# Patient Record
Sex: MALE | Race: WHITE | Employment: FULL TIME | URBAN - NONMETROPOLITAN AREA
[De-identification: names, ages, dates, MRNs, and addresses within clinical notes are randomized per-mention and may not be internally consistent; named-entity substitution may affect disease eponyms.]

---

## 2017-01-03 PROBLEM — F17.210 CIGARETTE SMOKER: Status: ACTIVE | Noted: 2017-01-03

## 2017-01-03 PROBLEM — J32.9 SINUSITIS: Status: ACTIVE | Noted: 2017-01-03

## 2017-01-03 PROBLEM — H66.92 LEFT OTITIS MEDIA: Status: ACTIVE | Noted: 2017-01-03

## 2017-06-03 ENCOUNTER — HOSPITAL ENCOUNTER (OUTPATIENT)
Age: 47
Discharge: HOME | End: 2017-06-03
Payer: COMMERCIAL

## 2017-06-03 DIAGNOSIS — E78.5 HYPERLIPIDEMIA, UNSPECIFIED HYPERLIPIDEMIA TYPE: ICD-10-CM

## 2017-06-03 DIAGNOSIS — Z12.5 SCREENING PSA (PROSTATE SPECIFIC ANTIGEN): ICD-10-CM

## 2017-06-03 DIAGNOSIS — R73.9 HYPERGLYCEMIA: ICD-10-CM

## 2017-06-03 LAB
ALT SERPL-CCNC: 31 U/L (ref 5–41)
ANION GAP SERPL CALCULATED.3IONS-SCNC: 13 MMOL/L (ref 9–17)
AST SERPL-CCNC: 20 U/L
BUN BLDV-MCNC: 13 MG/DL (ref 6–20)
BUN/CREAT BLD: 16 (ref 9–20)
CALCIUM SERPL-MCNC: 9.1 MG/DL (ref 8.6–10.4)
CHLORIDE BLD-SCNC: 103 MMOL/L (ref 98–107)
CHOLESTEROL/HDL RATIO: 3.9
CHOLESTEROL: 211 MG/DL
CO2: 21 MMOL/L (ref 20–31)
CREAT SERPL-MCNC: 0.82 MG/DL (ref 0.7–1.2)
ESTIMATED AVERAGE GLUCOSE: 103 MG/DL
GFR AFRICAN AMERICAN: >60 ML/MIN
GFR NON-AFRICAN AMERICAN: >60 ML/MIN
GFR SERPL CREATININE-BSD FRML MDRD: ABNORMAL ML/MIN/{1.73_M2}
GFR SERPL CREATININE-BSD FRML MDRD: ABNORMAL ML/MIN/{1.73_M2}
GLUCOSE BLD-MCNC: 103 MG/DL (ref 70–99)
HBA1C MFR BLD: 5.2 % (ref 4.8–5.9)
HCT VFR BLD CALC: 47.1 % (ref 41–53)
HDLC SERPL-MCNC: 54 MG/DL
HEMOGLOBIN: 16 G/DL (ref 13.5–17)
HIGH SENSITIVE C-REACTIVE PROTEIN: 1.7 MG/L
LDL CHOLESTEROL: 134 MG/DL (ref 0–130)
MCH RBC QN AUTO: 31.7 PG (ref 26–34)
MCHC RBC AUTO-ENTMCNC: 34 G/DL (ref 31–37)
MCV RBC AUTO: 93.2 FL (ref 80–100)
PDW BLD-RTO: 12.8 % (ref 12.1–15.2)
PLATELET # BLD: 232 K/UL (ref 140–450)
PMV BLD AUTO: NORMAL FL (ref 6–12)
POTASSIUM SERPL-SCNC: 4.1 MMOL/L (ref 3.7–5.3)
PROSTATE SPECIFIC ANTIGEN: 0.88 UG/L
RBC # BLD: 5.05 M/UL (ref 4.5–5.9)
SODIUM BLD-SCNC: 137 MMOL/L (ref 135–144)
TRIGL SERPL-MCNC: 113 MG/DL
VLDLC SERPL CALC-MCNC: ABNORMAL MG/DL (ref 1–30)
WBC # BLD: 6.7 K/UL (ref 3.5–11)

## 2017-06-03 PROCEDURE — 86141 C-REACTIVE PROTEIN HS: CPT

## 2017-06-03 PROCEDURE — 80048 BASIC METABOLIC PNL TOTAL CA: CPT

## 2017-06-03 PROCEDURE — 36415 COLL VENOUS BLD VENIPUNCTURE: CPT

## 2017-06-03 PROCEDURE — 84450 TRANSFERASE (AST) (SGOT): CPT

## 2017-06-03 PROCEDURE — 85027 COMPLETE CBC AUTOMATED: CPT

## 2017-06-03 PROCEDURE — G0103 PSA SCREENING: HCPCS

## 2017-06-03 PROCEDURE — 84460 ALANINE AMINO (ALT) (SGPT): CPT

## 2017-06-03 PROCEDURE — 83036 HEMOGLOBIN GLYCOSYLATED A1C: CPT

## 2017-06-03 PROCEDURE — 80061 LIPID PANEL: CPT

## 2017-06-06 PROBLEM — I10 ESSENTIAL HYPERTENSION: Status: ACTIVE | Noted: 2017-06-06

## 2017-06-06 PROBLEM — K21.9 GASTROESOPHAGEAL REFLUX DISEASE: Status: ACTIVE | Noted: 2017-06-06

## 2017-06-06 PROBLEM — Z00.00 ROUTINE GENERAL MEDICAL EXAMINATION AT A HEALTH CARE FACILITY: Status: ACTIVE | Noted: 2017-06-06

## 2017-11-20 RX ORDER — LOVASTATIN 10 MG/1
10 TABLET ORAL NIGHTLY
Qty: 90 TABLET | Refills: 3 | Status: SHIPPED | OUTPATIENT
Start: 2017-11-20 | End: 2018-02-14 | Stop reason: SDUPTHER

## 2018-02-14 RX ORDER — LOVASTATIN 10 MG/1
10 TABLET ORAL NIGHTLY
Qty: 90 TABLET | Refills: 3 | Status: SHIPPED | OUTPATIENT
Start: 2018-02-14 | End: 2018-12-24 | Stop reason: SDUPTHER

## 2018-02-14 RX ORDER — LISINOPRIL 20 MG/1
20 TABLET ORAL DAILY
Qty: 90 TABLET | Refills: 3 | Status: SHIPPED | OUTPATIENT
Start: 2018-02-14 | End: 2018-12-24 | Stop reason: SDUPTHER

## 2018-07-13 ENCOUNTER — OFFICE VISIT (OUTPATIENT)
Dept: FAMILY MEDICINE CLINIC | Age: 48
End: 2018-07-13
Payer: COMMERCIAL

## 2018-07-13 VITALS
SYSTOLIC BLOOD PRESSURE: 112 MMHG | DIASTOLIC BLOOD PRESSURE: 72 MMHG | BODY MASS INDEX: 31.2 KG/M2 | WEIGHT: 198.8 LBS | HEIGHT: 67 IN

## 2018-07-13 DIAGNOSIS — R73.9 HYPERGLYCEMIA: ICD-10-CM

## 2018-07-13 DIAGNOSIS — Z00.00 ROUTINE GENERAL MEDICAL EXAMINATION AT A HEALTH CARE FACILITY: Primary | ICD-10-CM

## 2018-07-13 DIAGNOSIS — K21.9 GASTROESOPHAGEAL REFLUX DISEASE, ESOPHAGITIS PRESENCE NOT SPECIFIED: ICD-10-CM

## 2018-07-13 DIAGNOSIS — F17.210 CIGARETTE SMOKER: ICD-10-CM

## 2018-07-13 DIAGNOSIS — I10 ESSENTIAL HYPERTENSION: ICD-10-CM

## 2018-07-13 DIAGNOSIS — Z12.5 SCREENING PSA (PROSTATE SPECIFIC ANTIGEN): ICD-10-CM

## 2018-07-13 DIAGNOSIS — E78.49 OTHER HYPERLIPIDEMIA: ICD-10-CM

## 2018-07-13 PROCEDURE — 99396 PREV VISIT EST AGE 40-64: CPT | Performed by: FAMILY MEDICINE

## 2018-07-13 ASSESSMENT — PATIENT HEALTH QUESTIONNAIRE - PHQ9
2. FEELING DOWN, DEPRESSED OR HOPELESS: 0
SUM OF ALL RESPONSES TO PHQ9 QUESTIONS 1 & 2: 0
SUM OF ALL RESPONSES TO PHQ QUESTIONS 1-9: 0
1. LITTLE INTEREST OR PLEASURE IN DOING THINGS: 0

## 2018-07-13 NOTE — PATIENT INSTRUCTIONS
PLAN:  I discuss with him the importance of smoking cessation. The patient is educated on the negative effects that smoking has on his health and the importance of smoking cessation. I advised him to read the book The Easy Way to Quit Smoking by Conchita Pro  He is doing well. I will order labs and call with results. I will see him back in 1 year.

## 2018-07-13 NOTE — PROGRESS NOTES
irregular heartbeat. Denies palpitations. Gastrointestinal: Denies abdominal pain. Denies blood in the stool. Denies constipation. Denies diarrhea. Denies nausea. Denies vomiting. Genitourinary: Denies blood in the urine. Denies difficulty urinating. Denies frequent urination. Denies painful urination. Denies urinary incontinence  Musculoskeletal: Denies muscle aches. Denies painful joints. Denies swollen joints. Peripheral Vascular: Denies pain/cramping in legs after exertion. Skin: Denies dry skin. Denies itching. Denies rash. Neurologic: Denies falls. Denies dizziness. Denies fainting. Denies tingling/numbness. Psychiatric: Denies sleep disturbance. Denies anxiety. Denies depressed mood. Past Surgical History:   Procedure Laterality Date    OTHER SURGICAL HISTORY Left 2005    left parotid tumor, benign. Family History   Problem Relation Age of Onset    High Blood Pressure Father     Cancer Father         Lung    Diabetes Maternal Grandmother     Cancer Maternal Grandfather         colon     Past Medical History:   Diagnosis Date    Hyperglycemia 4/4/2016    Hyperlipidemia 10/11    Hypertension 2004    Nicotine abuse 10/2011    Obesity 10/2011      Social History   Substance Use Topics    Smoking status: Current Every Day Smoker     Packs/day: 1.00     Types: Cigarettes    Smokeless tobacco: Never Used    Alcohol use 0.0 oz/week      Current Outpatient Prescriptions   Medication Sig Dispense Refill    lisinopril (PRINIVIL;ZESTRIL) 20 MG tablet Take 1 tablet by mouth daily 90 tablet 3    lovastatin (MEVACOR) 10 MG tablet Take 1 tablet by mouth nightly 90 tablet 3    esomeprazole Magnesium (NEXIUM) 40 MG PACK Take 40 mg by mouth daily      aspirin 81 MG tablet Take 81 mg by mouth daily       No current facility-administered medications for this visit.       No Known Allergies    PHYSICAL EXAM:    /72 (Site: Left Arm, Position: Sitting, Cuff Size: Medium Adult)   Ht 5' 7\" (1.702 m)

## 2018-07-21 ENCOUNTER — HOSPITAL ENCOUNTER (OUTPATIENT)
Age: 48
Discharge: HOME | End: 2018-07-21
Payer: COMMERCIAL

## 2018-07-21 DIAGNOSIS — Z12.5 SCREENING PSA (PROSTATE SPECIFIC ANTIGEN): ICD-10-CM

## 2018-07-21 DIAGNOSIS — E78.49 OTHER HYPERLIPIDEMIA: ICD-10-CM

## 2018-07-21 DIAGNOSIS — R73.9 HYPERGLYCEMIA: ICD-10-CM

## 2018-07-21 LAB
ALT SERPL-CCNC: 32 U/L (ref 5–41)
ANION GAP SERPL CALCULATED.3IONS-SCNC: 9 MMOL/L (ref 9–17)
AST SERPL-CCNC: 17 U/L
BUN BLDV-MCNC: 21 MG/DL (ref 6–20)
BUN/CREAT BLD: 23 (ref 9–20)
CALCIUM SERPL-MCNC: 9.4 MG/DL (ref 8.6–10.4)
CHLORIDE BLD-SCNC: 110 MMOL/L (ref 98–107)
CO2: 24 MMOL/L (ref 20–31)
CREAT SERPL-MCNC: 0.91 MG/DL (ref 0.7–1.2)
ESTIMATED AVERAGE GLUCOSE: 91 MG/DL
GFR AFRICAN AMERICAN: >60 ML/MIN
GFR NON-AFRICAN AMERICAN: >60 ML/MIN
GFR SERPL CREATININE-BSD FRML MDRD: ABNORMAL ML/MIN/{1.73_M2}
GFR SERPL CREATININE-BSD FRML MDRD: ABNORMAL ML/MIN/{1.73_M2}
GLUCOSE BLD-MCNC: 97 MG/DL (ref 70–99)
HBA1C MFR BLD: 4.8 % (ref 4.8–5.9)
HCT VFR BLD CALC: 46.9 % (ref 40.7–50.3)
HEMOGLOBIN: 16 G/DL (ref 13–17)
MCH RBC QN AUTO: 32.4 PG (ref 25.2–33.5)
MCHC RBC AUTO-ENTMCNC: 34.1 G/DL (ref 28.4–34.8)
MCV RBC AUTO: 94.9 FL (ref 82.6–102.9)
NRBC AUTOMATED: 0 PER 100 WBC
PDW BLD-RTO: 12.2 % (ref 11.8–14.4)
PLATELET # BLD: 226 K/UL (ref 138–453)
PMV BLD AUTO: 10.2 FL (ref 8.1–13.5)
POTASSIUM SERPL-SCNC: 4.7 MMOL/L (ref 3.7–5.3)
PROSTATE SPECIFIC ANTIGEN: 1.14 UG/L
RBC # BLD: 4.94 M/UL (ref 4.21–5.77)
SODIUM BLD-SCNC: 143 MMOL/L (ref 135–144)
WBC # BLD: 6.6 K/UL (ref 3.5–11.3)

## 2018-07-21 PROCEDURE — 36415 COLL VENOUS BLD VENIPUNCTURE: CPT

## 2018-07-21 PROCEDURE — 84450 TRANSFERASE (AST) (SGOT): CPT

## 2018-07-21 PROCEDURE — 84460 ALANINE AMINO (ALT) (SGPT): CPT

## 2018-07-21 PROCEDURE — 83036 HEMOGLOBIN GLYCOSYLATED A1C: CPT

## 2018-07-21 PROCEDURE — 85027 COMPLETE CBC AUTOMATED: CPT

## 2018-07-21 PROCEDURE — G0103 PSA SCREENING: HCPCS

## 2018-07-21 PROCEDURE — 80048 BASIC METABOLIC PNL TOTAL CA: CPT

## 2018-09-26 PROBLEM — Z00.00 ROUTINE GENERAL MEDICAL EXAMINATION AT A HEALTH CARE FACILITY: Status: RESOLVED | Noted: 2017-06-06 | Resolved: 2018-09-26

## 2018-09-27 PROBLEM — J32.9 SINUSITIS: Status: RESOLVED | Noted: 2017-01-03 | Resolved: 2018-09-27

## 2018-12-26 RX ORDER — LISINOPRIL 20 MG/1
20 TABLET ORAL DAILY
Qty: 90 TABLET | Refills: 3 | Status: SHIPPED | OUTPATIENT
Start: 2018-12-26 | End: 2019-09-21 | Stop reason: SDUPTHER

## 2018-12-26 RX ORDER — LOVASTATIN 10 MG/1
10 TABLET ORAL NIGHTLY
Qty: 90 TABLET | Refills: 3 | Status: SHIPPED | OUTPATIENT
Start: 2018-12-26 | End: 2019-09-21 | Stop reason: SDUPTHER

## 2019-07-16 ENCOUNTER — OFFICE VISIT (OUTPATIENT)
Dept: FAMILY MEDICINE CLINIC | Age: 49
End: 2019-07-16
Payer: COMMERCIAL

## 2019-07-16 VITALS
WEIGHT: 214 LBS | HEIGHT: 67 IN | BODY MASS INDEX: 33.59 KG/M2 | SYSTOLIC BLOOD PRESSURE: 118 MMHG | DIASTOLIC BLOOD PRESSURE: 62 MMHG

## 2019-07-16 DIAGNOSIS — R73.9 HYPERGLYCEMIA: ICD-10-CM

## 2019-07-16 DIAGNOSIS — Z00.00 ROUTINE GENERAL MEDICAL EXAMINATION AT A HEALTH CARE FACILITY: Primary | ICD-10-CM

## 2019-07-16 DIAGNOSIS — I10 ESSENTIAL HYPERTENSION: ICD-10-CM

## 2019-07-16 DIAGNOSIS — E78.5 HYPERLIPIDEMIA, UNSPECIFIED HYPERLIPIDEMIA TYPE: ICD-10-CM

## 2019-07-16 DIAGNOSIS — F17.210 CIGARETTE SMOKER: ICD-10-CM

## 2019-07-16 DIAGNOSIS — K21.9 GASTROESOPHAGEAL REFLUX DISEASE, ESOPHAGITIS PRESENCE NOT SPECIFIED: ICD-10-CM

## 2019-07-16 PROCEDURE — 99396 PREV VISIT EST AGE 40-64: CPT | Performed by: FAMILY MEDICINE

## 2019-07-16 ASSESSMENT — PATIENT HEALTH QUESTIONNAIRE - PHQ9
1. LITTLE INTEREST OR PLEASURE IN DOING THINGS: 0
SUM OF ALL RESPONSES TO PHQ QUESTIONS 1-9: 0
2. FEELING DOWN, DEPRESSED OR HOPELESS: 0
SUM OF ALL RESPONSES TO PHQ9 QUESTIONS 1 & 2: 0
SUM OF ALL RESPONSES TO PHQ QUESTIONS 1-9: 0

## 2019-07-16 NOTE — PROGRESS NOTES
Allergies    PHYSICAL EXAM:    /62 (Site: Left Upper Arm, Position: Sitting, Cuff Size: Medium Adult)   Ht 5' 7\" (1.702 m)   Wt 214 lb (97.1 kg)   BMI 33.52 kg/m²   Wt Readings from Last 3 Encounters:   07/16/19 214 lb (97.1 kg)   07/13/18 198 lb 12.8 oz (90.2 kg)   06/06/17 223 lb (101.2 kg)     BP Readings from Last 3 Encounters:   07/16/19 118/62   07/13/18 112/72   06/06/17 120/80       General Appearance: in no acute distress, well developed, well nourished. Eyes: pupils equal, round reactive to light and accommodation. Ears: normal canal and TM's. Nose: nares patent, no lesions. Oral Cavity: mucosa moist.  Throat: clear. Neck/Thyroid: neck supple, full range of motion, no cervical lymphadenopathy, no thyromegaly or carotid bruits. Skin: warm and dry. No suspicious lesions. Heart: regular rate and rhythm. No murmurs. S1, S2 normal, no gallops. Rate 60  Lungs: clear to auscultation bilaterally. Abdomen: bowel sounds present, soft, nontender, nondistended, no masses or organomegaly. Musculoskeletal: normal, full range of motion in knees and hips, no swelling or tenderness. Extremities: no cyanosis or edema. Peripheral Pulses: 2+ throughout, symetric. Neurologic: nonfocal, motor strength normal upper and lower extremities, sensory exam intact. Psych: normal affect, speech fluent. ASSESSMENT:   Diagnosis Orders   1. Routine general medical examination at a health care facility  ALT    AST    Basic Metabolic Panel    CBC    CRP,High Sensitivity    Hemoglobin A1C    Lipid Panel    Psa screening   2. Gastroesophageal reflux disease, esophagitis presence not specified     3. Essential hypertension     4. Hyperglycemia     5. Hyperlipidemia, unspecified hyperlipidemia type     6. Cigarette smoker         PLAN:  He does a lot of outside work, mining, paving and construction. He is aware of skin cancers. I would like for him to keep an eye on the nose and the ears.   He is is not ready to quit smoking. His father passed away from lung cancer and heart issues. He has a lot going on with work. He does have some high risks because him mom has heart issues as well. I would really like for him to consider quitting smoking. He has tried Chantix in the past, he had bad dreams and no  Sleep while on this. He can always go cold turkey. He is going to have to come to terms and quit on his own. I will have him get some labs after his appointment today, to check his chemistry, PSA and a1c. He is watching what he eats and keeping an eye on his carb intake. I would like for him to cut back on the Nexium somewhat, this is a medication if on for long term he has a higher risk of osteoporosis. Orders Placed This Encounter   Procedures    ALT     Standing Status:   Future     Standing Expiration Date:   7/15/2020    AST     Standing Status:   Future     Standing Expiration Date:   7/15/2020    Basic Metabolic Panel     Standing Status:   Future     Standing Expiration Date:   7/15/2020    CBC     Standing Status:   Future     Standing Expiration Date:   7/15/2020    CRP,High Sensitivity     Standing Status:   Future     Standing Expiration Date:   7/15/2020    Hemoglobin A1C     Standing Status:   Future     Standing Expiration Date:   7/15/2020    Lipid Panel     Standing Status:   Future     Standing Expiration Date:   7/15/2020     Order Specific Question:   Is Patient Fasting?/# of Hours     Answer:   no fasting    Psa screening     Standing Status:   Future     Standing Expiration Date:   7/15/2020     No orders of the defined types were placed in this encounter. I, Dr. Salvatore Odom, personally performed the services described in this documentation as scribed by ESTEFANY Soliman in my presence, and is both accurate and complete.

## 2019-08-15 PROBLEM — Z00.00 ROUTINE GENERAL MEDICAL EXAMINATION AT A HEALTH CARE FACILITY: Status: RESOLVED | Noted: 2017-06-06 | Resolved: 2019-08-15

## 2019-09-23 RX ORDER — LISINOPRIL 20 MG/1
20 TABLET ORAL DAILY
Qty: 90 TABLET | Refills: 3 | Status: SHIPPED | OUTPATIENT
Start: 2019-09-23 | End: 2020-10-07

## 2019-09-23 RX ORDER — LOVASTATIN 10 MG/1
10 TABLET ORAL NIGHTLY
Qty: 90 TABLET | Refills: 3 | Status: SHIPPED | OUTPATIENT
Start: 2019-09-23 | End: 2020-10-07

## 2020-07-20 ENCOUNTER — OFFICE VISIT (OUTPATIENT)
Dept: FAMILY MEDICINE CLINIC | Age: 50
End: 2020-07-20
Payer: COMMERCIAL

## 2020-07-20 ENCOUNTER — HOSPITAL ENCOUNTER (OUTPATIENT)
Age: 50
Discharge: HOME | End: 2020-07-20
Payer: COMMERCIAL

## 2020-07-20 VITALS
HEIGHT: 67 IN | BODY MASS INDEX: 35.16 KG/M2 | DIASTOLIC BLOOD PRESSURE: 70 MMHG | WEIGHT: 224 LBS | SYSTOLIC BLOOD PRESSURE: 116 MMHG

## 2020-07-20 LAB
ALT SERPL-CCNC: 35 U/L (ref 5–41)
ANION GAP SERPL CALCULATED.3IONS-SCNC: 9 MMOL/L (ref 9–17)
AST SERPL-CCNC: 22 U/L
BUN BLDV-MCNC: 15 MG/DL (ref 6–20)
BUN/CREAT BLD: 19 (ref 9–20)
CALCIUM SERPL-MCNC: 9.6 MG/DL (ref 8.6–10.4)
CHLORIDE BLD-SCNC: 105 MMOL/L (ref 98–107)
CHOLESTEROL/HDL RATIO: 3.5
CHOLESTEROL: 196 MG/DL
CO2: 24 MMOL/L (ref 20–31)
CREAT SERPL-MCNC: 0.81 MG/DL (ref 0.7–1.2)
ESTIMATED AVERAGE GLUCOSE: 103 MG/DL
GFR AFRICAN AMERICAN: >60 ML/MIN
GFR NON-AFRICAN AMERICAN: >60 ML/MIN
GFR SERPL CREATININE-BSD FRML MDRD: ABNORMAL ML/MIN/{1.73_M2}
GFR SERPL CREATININE-BSD FRML MDRD: ABNORMAL ML/MIN/{1.73_M2}
GLUCOSE BLD-MCNC: 107 MG/DL (ref 70–99)
HBA1C MFR BLD: 5.2 % (ref 4.8–5.9)
HCT VFR BLD CALC: 48.3 % (ref 40.7–50.3)
HDLC SERPL-MCNC: 56 MG/DL
HEMOGLOBIN: 16.7 G/DL (ref 13–17)
HIGH SENSITIVE C-REACTIVE PROTEIN: 1.3 MG/L
LDL CHOLESTEROL: 117 MG/DL (ref 0–130)
MCH RBC QN AUTO: 32.9 PG (ref 25.2–33.5)
MCHC RBC AUTO-ENTMCNC: 34.6 G/DL (ref 28.4–34.8)
MCV RBC AUTO: 95.1 FL (ref 82.6–102.9)
NRBC AUTOMATED: 0 PER 100 WBC
PDW BLD-RTO: 12.2 % (ref 11.8–14.4)
PLATELET # BLD: 250 K/UL (ref 138–453)
PMV BLD AUTO: 10.4 FL (ref 8.1–13.5)
POTASSIUM SERPL-SCNC: 4.9 MMOL/L (ref 3.7–5.3)
PROSTATE SPECIFIC ANTIGEN: 1.38 UG/L
RBC # BLD: 5.08 M/UL (ref 4.21–5.77)
SODIUM BLD-SCNC: 138 MMOL/L (ref 135–144)
TRIGL SERPL-MCNC: 116 MG/DL
VLDLC SERPL CALC-MCNC: NORMAL MG/DL (ref 1–30)
WBC # BLD: 7.1 K/UL (ref 3.5–11.3)

## 2020-07-20 PROCEDURE — 84450 TRANSFERASE (AST) (SGOT): CPT

## 2020-07-20 PROCEDURE — 99396 PREV VISIT EST AGE 40-64: CPT | Performed by: FAMILY MEDICINE

## 2020-07-20 PROCEDURE — G0103 PSA SCREENING: HCPCS

## 2020-07-20 PROCEDURE — 80061 LIPID PANEL: CPT

## 2020-07-20 PROCEDURE — 84460 ALANINE AMINO (ALT) (SGPT): CPT

## 2020-07-20 PROCEDURE — 86141 C-REACTIVE PROTEIN HS: CPT

## 2020-07-20 PROCEDURE — 36415 COLL VENOUS BLD VENIPUNCTURE: CPT

## 2020-07-20 PROCEDURE — 83036 HEMOGLOBIN GLYCOSYLATED A1C: CPT

## 2020-07-20 PROCEDURE — 85027 COMPLETE CBC AUTOMATED: CPT

## 2020-07-20 PROCEDURE — 80048 BASIC METABOLIC PNL TOTAL CA: CPT

## 2020-07-20 SDOH — ECONOMIC STABILITY: FOOD INSECURITY: WITHIN THE PAST 12 MONTHS, YOU WORRIED THAT YOUR FOOD WOULD RUN OUT BEFORE YOU GOT MONEY TO BUY MORE.: NEVER TRUE

## 2020-07-20 SDOH — ECONOMIC STABILITY: INCOME INSECURITY: HOW HARD IS IT FOR YOU TO PAY FOR THE VERY BASICS LIKE FOOD, HOUSING, MEDICAL CARE, AND HEATING?: NOT HARD AT ALL

## 2020-07-20 SDOH — ECONOMIC STABILITY: FOOD INSECURITY: WITHIN THE PAST 12 MONTHS, THE FOOD YOU BOUGHT JUST DIDN'T LAST AND YOU DIDN'T HAVE MONEY TO GET MORE.: NEVER TRUE

## 2020-07-20 ASSESSMENT — PATIENT HEALTH QUESTIONNAIRE - PHQ9
1. LITTLE INTEREST OR PLEASURE IN DOING THINGS: 0
SUM OF ALL RESPONSES TO PHQ9 QUESTIONS 1 & 2: 0
SUM OF ALL RESPONSES TO PHQ QUESTIONS 1-9: 0
2. FEELING DOWN, DEPRESSED OR HOPELESS: 0
SUM OF ALL RESPONSES TO PHQ QUESTIONS 1-9: 0

## 2020-07-20 NOTE — PROGRESS NOTES
irregular heartbeat. Denies palpitations. Gastrointestinal: Denies abdominal pain. Denies blood in the stool. Denies constipation. Denies diarrhea. Denies nausea. Denies vomiting. Genitourinary: Denies blood in the urine. Denies difficulty urinating. Denies frequent urination. Denies painful urination. Denies urinary incontinence. Musculoskeletal: Denies muscle aches. Denies painful joints. Denies swollen joints. Peripheral Vascular: Denies pain/cramping in legs after exertion. Skin: Denies dry skin. Denies itching. Denies rash. Neurologic: Denies falls. Denies dizziness. Denies fainting. Denies tingling/numbness. Psychiatric: Denies sleep disturbance. Denies anxiety. Denies depressed mood. Past Surgical History:   Procedure Laterality Date    OTHER SURGICAL HISTORY Left 2005    left parotid tumor, benign. Family History   Problem Relation Age of Onset    High Blood Pressure Father     Cancer Father         Lung    Diabetes Maternal Grandmother     Cancer Maternal Grandfather         colon       Past Medical History:   Diagnosis Date    Hyperglycemia 4/4/2016    Hyperlipidemia 10/11    Hypertension 2004    Nicotine abuse 10/2011    Obesity 10/2011      Social History     Tobacco Use    Smoking status: Current Every Day Smoker     Packs/day: 1.00     Types: Cigarettes    Smokeless tobacco: Never Used   Substance Use Topics    Alcohol use: Yes     Alcohol/week: 0.0 standard drinks      Current Outpatient Medications   Medication Sig Dispense Refill    lovastatin (MEVACOR) 10 MG tablet TAKE 1 TABLET BY MOUTH  NIGHTLY 90 tablet 3    lisinopril (PRINIVIL;ZESTRIL) 20 MG tablet TAKE 1 TABLET BY MOUTH  DAILY 90 tablet 3    esomeprazole Magnesium (NEXIUM) 40 MG PACK Take 40 mg by mouth daily      aspirin 81 MG tablet Take 81 mg by mouth daily       No current facility-administered medications for this visit.       No Known Allergies    PHYSICAL EXAM:    /70   Ht 5' 7\" (1.702 m)   Wt 224 lb (101.6 kg)   BMI 35.08 kg/m²   Wt Readings from Last 3 Encounters:   07/20/20 224 lb (101.6 kg)   07/16/19 214 lb (97.1 kg)   07/13/18 198 lb 12.8 oz (90.2 kg)     BP Readings from Last 3 Encounters:   07/20/20 116/70   07/16/19 118/62   07/13/18 112/72       General Appearance: in no acute distress, well developed, well nourished. Eyes: pupils equal, round reactive to light and accommodation. Ears: normal canal and TM's. Nose: nares patent, no lesions. Oral Cavity: mucosa moist.  Throat: clear. Neck/Thyroid: neck supple, full range of motion, no cervical lymphadenopathy, no thyromegaly or carotid bruits. Skin: warm and dry. Purplish hue to ears, sun damage skin to ears. Telangiectasia to cheeks. Heart: regular rate and rhythm. No murmurs. S1, S2 normal, no gallops. Rate: 75  Lungs: clear to auscultation bilaterally. Abdomen: bowel sounds present, soft, nontender, nondistended, no masses or organomegaly. Musculoskeletal: normal, full range of motion in knees and hips, no swelling or tenderness. Extremities: no cyanosis or edema. Peripheral Pulses: 2+ throughout, symetric. Neurologic: nonfocal, motor strength normal upper and lower extremities, sensory exam intact. Psych: normal affect, speech fluent. ASSESSMENT:   Diagnosis Orders   1. Routine general medical examination at a health care facility  ALT    AST    Basic Metabolic Panel    CBC    CRP,High Sensitivity    Hemoglobin A1C    Lipid Panel    Psa screening   2. Essential hypertension     3. Hyperglycemia     4. Cigarette smoker         PLAN:  We discuss smoking cessation and how this is the most important thing that he can do for his health. He has the book The Easy Way to Stop Smoking but hasn't finished reading it. I encourage him to think about this. He continues on Nexium for GERD. This is stable. I recommend that he try to take this every other day to see if this still manages symptoms.  I would like him to get by with as

## 2020-07-20 NOTE — PATIENT INSTRUCTIONS
PLAN:  We discuss smoking cessation and how this is the most important thing that he can do for his health. He has the book The Easy Way to Stop Smoking but hasn't finished reading it. I encourage him to think about this. He continues on Nexium for GERD. This is stable. I recommend that he try to take this every other day to see if this still manages symptoms. I would like him to get by with as little as possible as long as it manages his symptoms. He has not had lab work done for 2 years. I will give orders for this, today. I will see him back in 1 year or sooner. SURVEY:    You may be receiving a survey from [facility] regarding your visit today. Please complete the survey to enable us to provide the highest quality of care to you and your family. If you cannot score us a very good on any question, please call the office to discuss how we could have made your experience a very good one. Thank you.

## 2020-10-07 RX ORDER — LOVASTATIN 10 MG/1
10 TABLET ORAL NIGHTLY
Qty: 90 TABLET | Refills: 3 | Status: SHIPPED | OUTPATIENT
Start: 2020-10-07 | End: 2021-12-06 | Stop reason: SDUPTHER

## 2020-10-07 RX ORDER — LISINOPRIL 20 MG/1
20 TABLET ORAL DAILY
Qty: 90 TABLET | Refills: 3 | Status: SHIPPED | OUTPATIENT
Start: 2020-10-07 | End: 2021-12-06 | Stop reason: SDUPTHER

## 2021-07-20 ENCOUNTER — HOSPITAL ENCOUNTER (OUTPATIENT)
Age: 51
Discharge: HOME | End: 2021-07-20
Payer: COMMERCIAL

## 2021-07-20 ENCOUNTER — OFFICE VISIT (OUTPATIENT)
Dept: FAMILY MEDICINE CLINIC | Age: 51
End: 2021-07-20
Payer: COMMERCIAL

## 2021-07-20 VITALS
HEIGHT: 67 IN | BODY MASS INDEX: 32.49 KG/M2 | SYSTOLIC BLOOD PRESSURE: 116 MMHG | WEIGHT: 207 LBS | DIASTOLIC BLOOD PRESSURE: 60 MMHG

## 2021-07-20 DIAGNOSIS — I10 ESSENTIAL HYPERTENSION: ICD-10-CM

## 2021-07-20 DIAGNOSIS — D75.1 ERYTHROCYTOSIS: ICD-10-CM

## 2021-07-20 DIAGNOSIS — Z00.00 ROUTINE GENERAL MEDICAL EXAMINATION AT A HEALTH CARE FACILITY: ICD-10-CM

## 2021-07-20 DIAGNOSIS — E66.9 CLASS 1 OBESITY WITHOUT SERIOUS COMORBIDITY WITH BODY MASS INDEX (BMI) OF 31.0 TO 31.9 IN ADULT, UNSPECIFIED OBESITY TYPE: ICD-10-CM

## 2021-07-20 DIAGNOSIS — F17.210 CIGARETTE SMOKER: ICD-10-CM

## 2021-07-20 DIAGNOSIS — R73.9 HYPERGLYCEMIA: ICD-10-CM

## 2021-07-20 DIAGNOSIS — Z12.5 SCREENING FOR PROSTATE CANCER: ICD-10-CM

## 2021-07-20 DIAGNOSIS — Z00.00 ROUTINE GENERAL MEDICAL EXAMINATION AT A HEALTH CARE FACILITY: Primary | ICD-10-CM

## 2021-07-20 DIAGNOSIS — E78.5 HYPERLIPIDEMIA, UNSPECIFIED HYPERLIPIDEMIA TYPE: ICD-10-CM

## 2021-07-20 LAB
ABSOLUTE EOS #: 0.36 K/UL (ref 0–0.44)
ABSOLUTE IMMATURE GRANULOCYTE: <0.03 K/UL (ref 0–0.3)
ABSOLUTE LYMPH #: 1.69 K/UL (ref 1.1–3.7)
ABSOLUTE MONO #: 0.55 K/UL (ref 0.1–1.2)
ALT SERPL-CCNC: 31 U/L (ref 5–41)
ANION GAP SERPL CALCULATED.3IONS-SCNC: 12 MMOL/L (ref 9–17)
AST SERPL-CCNC: 24 U/L
BASOPHILS # BLD: 1 % (ref 0–2)
BASOPHILS ABSOLUTE: 0.05 K/UL (ref 0–0.2)
BUN BLDV-MCNC: 22 MG/DL (ref 6–20)
BUN/CREAT BLD: 26 (ref 9–20)
CALCIUM SERPL-MCNC: 9.3 MG/DL (ref 8.6–10.4)
CHLORIDE BLD-SCNC: 107 MMOL/L (ref 98–107)
CHOLESTEROL/HDL RATIO: 3.7
CHOLESTEROL: 202 MG/DL
CO2: 20 MMOL/L (ref 20–31)
CREAT SERPL-MCNC: 0.85 MG/DL (ref 0.7–1.2)
DIFFERENTIAL TYPE: ABNORMAL
EOSINOPHILS RELATIVE PERCENT: 5 % (ref 1–4)
ESTIMATED AVERAGE GLUCOSE: 117 MG/DL
GFR AFRICAN AMERICAN: >60 ML/MIN
GFR NON-AFRICAN AMERICAN: >60 ML/MIN
GFR SERPL CREATININE-BSD FRML MDRD: ABNORMAL ML/MIN/{1.73_M2}
GFR SERPL CREATININE-BSD FRML MDRD: ABNORMAL ML/MIN/{1.73_M2}
GLUCOSE BLD-MCNC: 95 MG/DL (ref 70–99)
HBA1C MFR BLD: 5.7 % (ref 4–6)
HCT VFR BLD CALC: 47.1 % (ref 40.7–50.3)
HDLC SERPL-MCNC: 55 MG/DL
HEMOGLOBIN: 16 G/DL (ref 13–17)
IMMATURE GRANULOCYTES: 0 %
LDL CHOLESTEROL: 131 MG/DL (ref 0–130)
LYMPHOCYTES # BLD: 25 % (ref 24–43)
MCH RBC QN AUTO: 32.5 PG (ref 25.2–33.5)
MCHC RBC AUTO-ENTMCNC: 34 G/DL (ref 28.4–34.8)
MCV RBC AUTO: 95.5 FL (ref 82.6–102.9)
MONOCYTES # BLD: 8 % (ref 3–12)
NRBC AUTOMATED: 0 PER 100 WBC
PDW BLD-RTO: 12.2 % (ref 11.8–14.4)
PLATELET # BLD: 233 K/UL (ref 138–453)
PLATELET ESTIMATE: ABNORMAL
PMV BLD AUTO: 10.4 FL (ref 8.1–13.5)
POTASSIUM SERPL-SCNC: 4.7 MMOL/L (ref 3.7–5.3)
PROSTATE SPECIFIC ANTIGEN: 1.14 UG/L
RBC # BLD: 4.93 M/UL (ref 4.21–5.77)
RBC # BLD: ABNORMAL 10*6/UL
SEG NEUTROPHILS: 61 % (ref 36–65)
SEGMENTED NEUTROPHILS ABSOLUTE COUNT: 4.01 K/UL (ref 1.5–8.1)
SODIUM BLD-SCNC: 139 MMOL/L (ref 135–144)
TRIGL SERPL-MCNC: 78 MG/DL
VLDLC SERPL CALC-MCNC: ABNORMAL MG/DL (ref 1–30)
WBC # BLD: 6.7 K/UL (ref 3.5–11.3)
WBC # BLD: ABNORMAL 10*3/UL

## 2021-07-20 PROCEDURE — 80048 BASIC METABOLIC PNL TOTAL CA: CPT

## 2021-07-20 PROCEDURE — 36415 COLL VENOUS BLD VENIPUNCTURE: CPT

## 2021-07-20 PROCEDURE — 85025 COMPLETE CBC W/AUTO DIFF WBC: CPT

## 2021-07-20 PROCEDURE — 80061 LIPID PANEL: CPT

## 2021-07-20 PROCEDURE — 84460 ALANINE AMINO (ALT) (SGPT): CPT

## 2021-07-20 PROCEDURE — 99396 PREV VISIT EST AGE 40-64: CPT | Performed by: FAMILY MEDICINE

## 2021-07-20 PROCEDURE — G0103 PSA SCREENING: HCPCS

## 2021-07-20 PROCEDURE — 83036 HEMOGLOBIN GLYCOSYLATED A1C: CPT

## 2021-07-20 PROCEDURE — 84450 TRANSFERASE (AST) (SGOT): CPT

## 2021-07-20 SDOH — ECONOMIC STABILITY: FOOD INSECURITY: WITHIN THE PAST 12 MONTHS, THE FOOD YOU BOUGHT JUST DIDN'T LAST AND YOU DIDN'T HAVE MONEY TO GET MORE.: NEVER TRUE

## 2021-07-20 SDOH — ECONOMIC STABILITY: FOOD INSECURITY: WITHIN THE PAST 12 MONTHS, YOU WORRIED THAT YOUR FOOD WOULD RUN OUT BEFORE YOU GOT MONEY TO BUY MORE.: NEVER TRUE

## 2021-07-20 ASSESSMENT — PATIENT HEALTH QUESTIONNAIRE - PHQ9
SUM OF ALL RESPONSES TO PHQ QUESTIONS 1-9: 0
2. FEELING DOWN, DEPRESSED OR HOPELESS: 0
SUM OF ALL RESPONSES TO PHQ9 QUESTIONS 1 & 2: 0
1. LITTLE INTEREST OR PLEASURE IN DOING THINGS: 0

## 2021-07-20 ASSESSMENT — SOCIAL DETERMINANTS OF HEALTH (SDOH): HOW HARD IS IT FOR YOU TO PAY FOR THE VERY BASICS LIKE FOOD, HOUSING, MEDICAL CARE, AND HEATING?: NOT HARD AT ALL

## 2021-07-20 NOTE — PATIENT INSTRUCTIONS
PLAN:  We discuss smoking cessation. At this time, he is not ready to quit. We discuss the importance of him being ready and wanting to quit. I just hope he makes this decision before something happens. If he starts to develop heavy snoring and is having difficulty sleeping or daytime sleepiness, or if his Hgb level rises above 17, I would recommend that we consider testing for sleep apnea. His blood pressure is well controlled on the lisinopril. It has been a year since his last labs. I will order some, today. He is due for colon cancer screening. He is going to check with his insurance company to see if they cover a colonoscopy. I will see him back in 1 year. SURVEY:    You may be receiving a survey from [employer] regarding your visit today. Please complete the survey to enable us to provide the highest quality of care to you and your family. If you cannot score us a very good on any question, please call the office to discuss how we could of made your experience a very good one. Thank you.

## 2021-07-20 NOTE — PROGRESS NOTES
palpitations. Gastrointestinal: Denies abdominal pain. Denies blood in the stool. Denies constipation. Denies diarrhea. Denies nausea. Denies vomiting. Genitourinary: Denies blood in the urine. Denies difficulty urinating. Denies frequent urination. Denies painful urination. Denies urinary incontinence. Musculoskeletal: Denies muscle aches. Denies painful joints. Denies swollen joints. Peripheral Vascular: Denies pain/cramping in legs after exertion. Skin: Denies dry skin. Denies itching. Denies rash. Neurologic: Denies falls. Denies dizziness. Denies fainting. Denies tingling/numbness. Psychiatric: Denies sleep disturbance. Denies anxiety. Denies depressed mood. Past Surgical History:   Procedure Laterality Date    OTHER SURGICAL HISTORY Left 2005    left parotid tumor, benign. Family History   Problem Relation Age of Onset    Cancer Father         Lung, smoker    High Blood Pressure Father     Diabetes Maternal Grandmother     Cancer Maternal Grandfather         colon       Past Medical History:   Diagnosis Date    Hyperglycemia 4/4/2016    Hyperlipidemia 10/11    Hypertension 2004    Nicotine abuse 10/2011    Obesity 10/2011      Social History     Tobacco Use    Smoking status: Current Every Day Smoker     Packs/day: 1.00     Types: Cigarettes    Smokeless tobacco: Never Used   Substance Use Topics    Alcohol use: Yes     Alcohol/week: 0.0 standard drinks      Current Outpatient Medications   Medication Sig Dispense Refill    lovastatin (MEVACOR) 10 MG tablet TAKE 1 TABLET BY MOUTH  NIGHTLY 90 tablet 3    lisinopril (PRINIVIL;ZESTRIL) 20 MG tablet TAKE 1 TABLET BY MOUTH  DAILY 90 tablet 3    esomeprazole Magnesium (NEXIUM) 40 MG PACK Take 40 mg by mouth daily      aspirin 81 MG tablet Take 81 mg by mouth daily       No current facility-administered medications for this visit.      No Known Allergies    PHYSICAL EXAM:    /60   Ht 5' 7\" (1.702 m)   Wt 207 lb (93.9 kg)   BMI 32.42 kg/m²   Wt Readings from Last 3 Encounters:   07/20/21 207 lb (93.9 kg)   07/20/20 224 lb (101.6 kg)   07/16/19 214 lb (97.1 kg)     BP Readings from Last 3 Encounters:   07/20/21 116/60   07/20/20 116/70   07/16/19 118/62       General Appearance: in no acute distress, well developed, well nourished. Eyes: pupils equal, round reactive to light and accommodation. Ears: normal canal and TM's. Nose: nares patent, no lesions. Oral Cavity: mucosa moist.  Throat: patulent uvula. Neck/Thyroid: neck supple, full range of motion, no cervical lymphadenopathy, no thyromegaly or carotid bruits. Skin: warm and dry. No suspicious lesions. Heart: regular rate and rhythm. No murmurs. S1, S2 normal, no gallops. Lungs: clear to auscultation bilaterally. Abdomen: bowel sounds present, soft, nontender, nondistended, no masses or organomegaly. Obese. Musculoskeletal: normal, full range of motion in knees and hips, no swelling or tenderness. Extremities: no cyanosis or edema. Peripheral Pulses: 2+ throughout, symetric. Neurologic: nonfocal, motor strength normal upper and lower extremities, sensory exam intact. Psych: normal affect, speech fluent. ASSESSMENT:   Diagnosis Orders   1. Routine general medical examination at a health care facility  CBC With Auto Differential    Basic Metabolic Panel    ALT    AST    Hemoglobin A1C    Lipid Panel    PSA Screening   2. Screening for prostate cancer  PSA Screening   3. Essential hypertension     4. Cigarette smoker     5. Hyperlipidemia, unspecified hyperlipidemia type     6. Hyperglycemia     7. Class 1 obesity without serious comorbidity with body mass index (BMI) of 31.0 to 31.9 in adult, unspecified obesity type     8. Erythrocytosis         PLAN:  We discuss smoking cessation. At this time, he is not ready to quit. We discuss the importance of him being ready and wanting to quit. I just hope he makes this decision before something happens.      If he starts to develop heavy snoring and is having difficulty sleeping or daytime sleepiness, or if his Hgb level rises above 17, I would recommend that we consider testing for sleep apnea. His blood pressure is well controlled on the lisinopril. It has been a year since his last labs. I will order some, today. He is due for colon cancer screening. He is going to check with his insurance company to see if they cover a colonoscopy. I will see him back in 1 year. Orders Placed This Encounter   Procedures    CBC With Auto Differential     Standing Status:   Future     Number of Occurrences:   1     Standing Expiration Date:   7/20/2022    Basic Metabolic Panel     Standing Status:   Future     Number of Occurrences:   1     Standing Expiration Date:   7/20/2022    ALT     Standing Status:   Future     Number of Occurrences:   1     Standing Expiration Date:   7/20/2022    AST     Standing Status:   Future     Number of Occurrences:   1     Standing Expiration Date:   7/20/2022    Hemoglobin A1C     Standing Status:   Future     Number of Occurrences:   1     Standing Expiration Date:   7/20/2022    Lipid Panel     Standing Status:   Future     Number of Occurrences:   1     Standing Expiration Date:   7/20/2022     Order Specific Question:   Is Patient Fasting?/# of Hours     Answer:   none    PSA Screening     Standing Status:   Future     Number of Occurrences:   1     Standing Expiration Date:   7/20/2022     No orders of the defined types were placed in this encounter. I, Dr. Leanne Bermeo, personally performed the services described in this documentation as scribed by ESTEFANY Piedra in my presence, and is both accurate and complete.

## 2021-07-21 NOTE — RESULT ENCOUNTER NOTE
Notify lab is overall good  A1c is in prediabetic range  PSA has improved  it is lower  LDL is mildly elevated  CBC and kidney and liver all ok

## 2021-10-29 DIAGNOSIS — Z12.11 SCREENING FOR COLON CANCER: Primary | ICD-10-CM

## 2021-12-06 RX ORDER — LISINOPRIL 20 MG/1
20 TABLET ORAL DAILY
Qty: 90 TABLET | Refills: 3 | Status: SHIPPED | OUTPATIENT
Start: 2021-12-06

## 2021-12-06 RX ORDER — LOVASTATIN 10 MG/1
10 TABLET ORAL NIGHTLY
Qty: 90 TABLET | Refills: 3 | Status: SHIPPED | OUTPATIENT
Start: 2021-12-06

## 2022-09-12 PROBLEM — Z78.9 CAFFEINE USE: Status: ACTIVE | Noted: 2022-09-12

## 2022-09-12 PROBLEM — Z80.0 FAMILY HISTORY OF COLON CANCER: Status: ACTIVE | Noted: 2022-09-12

## 2022-09-13 PROBLEM — Z13.9 SCREENING DUE: Status: ACTIVE | Noted: 2022-09-13

## 2024-11-06 NOTE — PATIENT INSTRUCTIONS
PLAN:  He does a lot of outside work, mining, paving and construction. He is aware of skin cancers. I would like for him to keep an eye on the nose and the ears. He is is not ready to quit smoking. His father passed away from lung cancer and heart issues. He has a lot going on with work. He does have some high risks because him mom has heart issues as well. I would really like for him to consider quitting smoking. He has tried Chantix in the past, he had bad dreams and no  Sleep while on this. He can always go cold turkey. He is going to have to come to terms and quit on his own. I will have him get some labs after his appointment today, to check his chemistry, PSA and a1c. He is watching what he eats and keeping an eye on his carb intake. I would like for him to cut back on the Nexium somewhat, this is a medication if on for long term he has a higher risk of osteoporosis. SURVEY:    You may be receiving a survey from [employer] regarding your visit today. Please complete the survey to enable us to provide the highest quality of care to you and your family. If you cannot score us a very good on any question, please call the office to discuss how we could have made your experience a very good one. Thank you.
Clear

## 2025-05-26 ENCOUNTER — HOSPITAL ENCOUNTER (EMERGENCY)
Age: 55
Discharge: HOME | End: 2025-05-26
Attending: EMERGENCY MEDICINE
Payer: COMMERCIAL

## 2025-05-26 ENCOUNTER — APPOINTMENT (OUTPATIENT)
Dept: CT IMAGING | Age: 55
End: 2025-05-26
Payer: COMMERCIAL

## 2025-05-26 VITALS
WEIGHT: 205 LBS | RESPIRATION RATE: 16 BRPM | SYSTOLIC BLOOD PRESSURE: 137 MMHG | TEMPERATURE: 97.9 F | HEART RATE: 79 BPM | BODY MASS INDEX: 30.27 KG/M2 | OXYGEN SATURATION: 99 % | DIASTOLIC BLOOD PRESSURE: 88 MMHG

## 2025-05-26 DIAGNOSIS — N40.0 ENLARGED PROSTATE: ICD-10-CM

## 2025-05-26 DIAGNOSIS — R31.0 GROSS HEMATURIA: Primary | ICD-10-CM

## 2025-05-26 DIAGNOSIS — N30.01 ACUTE CYSTITIS WITH HEMATURIA: ICD-10-CM

## 2025-05-26 DIAGNOSIS — M89.8X9 LYTIC LESION OF BONE ON X-RAY: ICD-10-CM

## 2025-05-26 LAB
ALBUMIN SERPL-MCNC: 4.3 G/DL (ref 3.5–5.2)
ALBUMIN/GLOB SERPL: 1.7 {RATIO} (ref 1–2.5)
ALP SERPL-CCNC: 59 U/L (ref 40–129)
ALT SERPL-CCNC: 45 U/L (ref 10–50)
ANION GAP SERPL CALCULATED.3IONS-SCNC: 10 MMOL/L (ref 9–16)
AST SERPL-CCNC: 32 U/L (ref 10–50)
BASOPHILS # BLD: 0.06 K/UL (ref 0–0.2)
BASOPHILS NFR BLD: 1 % (ref 0–2)
BILIRUB SERPL-MCNC: 0.8 MG/DL (ref 0–1.2)
BILIRUB UR QL STRIP: NEGATIVE
BUN SERPL-MCNC: 13 MG/DL (ref 6–20)
BUN/CREAT SERPL: 16 (ref 9–20)
CALCIUM SERPL-MCNC: 9.2 MG/DL (ref 8.6–10.4)
CHLORIDE SERPL-SCNC: 103 MMOL/L (ref 98–107)
CLARITY UR: CLEAR
CO2 SERPL-SCNC: 23 MMOL/L (ref 20–31)
COLOR UR: YELLOW
CREAT SERPL-MCNC: 0.8 MG/DL (ref 0.7–1.2)
EOSINOPHIL # BLD: 0.28 K/UL (ref 0–0.44)
EOSINOPHILS RELATIVE PERCENT: 4 % (ref 1–4)
EPI CELLS #/AREA URNS HPF: NORMAL /HPF (ref 0–5)
ERYTHROCYTE [DISTWIDTH] IN BLOOD BY AUTOMATED COUNT: 12.1 % (ref 11.8–14.4)
GFR, ESTIMATED: >90 ML/MIN/1.73M2
GLUCOSE SERPL-MCNC: 93 MG/DL (ref 74–99)
GLUCOSE UR STRIP-MCNC: NEGATIVE MG/DL
HCT VFR BLD AUTO: 45.8 % (ref 40.7–50.3)
HGB BLD-MCNC: 16 G/DL (ref 13–17)
HGB UR QL STRIP.AUTO: ABNORMAL
IMM GRANULOCYTES # BLD AUTO: <0.03 K/UL (ref 0–0.3)
IMM GRANULOCYTES NFR BLD: 0 %
INR PPP: 1
KETONES UR STRIP-MCNC: NEGATIVE MG/DL
LEUKOCYTE ESTERASE UR QL STRIP: NEGATIVE
LYMPHOCYTES NFR BLD: 1.89 K/UL (ref 1.1–3.7)
LYMPHOCYTES RELATIVE PERCENT: 28 % (ref 24–43)
MCH RBC QN AUTO: 32.7 PG (ref 25.2–33.5)
MCHC RBC AUTO-ENTMCNC: 34.9 G/DL (ref 28.4–34.8)
MCV RBC AUTO: 93.7 FL (ref 82.6–102.9)
MONOCYTES NFR BLD: 0.47 K/UL (ref 0.1–1.2)
MONOCYTES NFR BLD: 7 % (ref 3–12)
NEUTROPHILS NFR BLD: 60 % (ref 36–65)
NEUTS SEG NFR BLD: 4.13 K/UL (ref 1.5–8.1)
NITRITE UR QL STRIP: NEGATIVE
NRBC BLD-RTO: 0 PER 100 WBC
PH UR STRIP: 6.5 [PH] (ref 5–9)
PLATELET # BLD AUTO: 232 K/UL (ref 138–453)
PMV BLD AUTO: 10 FL (ref 8.1–13.5)
POTASSIUM SERPL-SCNC: 4.4 MMOL/L (ref 3.7–5.3)
PROT SERPL-MCNC: 6.9 G/DL (ref 6.6–8.7)
PROT UR STRIP-MCNC: NEGATIVE MG/DL
PROTHROMBIN TIME: 12.9 SEC (ref 11.7–14.1)
RBC # BLD AUTO: 4.89 M/UL (ref 4.21–5.77)
RBC #/AREA URNS HPF: NORMAL /HPF (ref 0–2)
SODIUM SERPL-SCNC: 136 MMOL/L (ref 136–145)
SP GR UR STRIP: 1.01 (ref 1.01–1.02)
UROBILINOGEN UR STRIP-ACNC: NORMAL EU/DL (ref 0–1)
WBC #/AREA URNS HPF: NORMAL /HPF (ref 0–5)
WBC OTHER # BLD: 6.8 K/UL (ref 3.5–11.3)

## 2025-05-26 PROCEDURE — 6370000000 HC RX 637 (ALT 250 FOR IP): Performed by: EMERGENCY MEDICINE

## 2025-05-26 PROCEDURE — 6360000004 HC RX CONTRAST MEDICATION: Performed by: EMERGENCY MEDICINE

## 2025-05-26 PROCEDURE — 74178 CT ABD&PLV WO CNTR FLWD CNTR: CPT

## 2025-05-26 PROCEDURE — 51798 US URINE CAPACITY MEASURE: CPT

## 2025-05-26 PROCEDURE — 2580000003 HC RX 258: Performed by: EMERGENCY MEDICINE

## 2025-05-26 PROCEDURE — 87086 URINE CULTURE/COLONY COUNT: CPT

## 2025-05-26 PROCEDURE — 81001 URINALYSIS AUTO W/SCOPE: CPT

## 2025-05-26 PROCEDURE — 99285 EMERGENCY DEPT VISIT HI MDM: CPT

## 2025-05-26 PROCEDURE — 80053 COMPREHEN METABOLIC PANEL: CPT

## 2025-05-26 PROCEDURE — 85610 PROTHROMBIN TIME: CPT

## 2025-05-26 PROCEDURE — 85025 COMPLETE CBC W/AUTO DIFF WBC: CPT

## 2025-05-26 RX ORDER — CEPHALEXIN 500 MG/1
500 CAPSULE ORAL ONCE
Status: COMPLETED | OUTPATIENT
Start: 2025-05-26 | End: 2025-05-26

## 2025-05-26 RX ORDER — IOPAMIDOL 755 MG/ML
75 INJECTION, SOLUTION INTRAVASCULAR
Status: COMPLETED | OUTPATIENT
Start: 2025-05-26 | End: 2025-05-26

## 2025-05-26 RX ORDER — CEPHALEXIN 500 MG/1
500 CAPSULE ORAL 4 TIMES DAILY
Qty: 28 CAPSULE | Refills: 0 | Status: SHIPPED | OUTPATIENT
Start: 2025-05-26 | End: 2025-06-02

## 2025-05-26 RX ORDER — 0.9 % SODIUM CHLORIDE 0.9 %
500 INTRAVENOUS SOLUTION INTRAVENOUS ONCE
Status: COMPLETED | OUTPATIENT
Start: 2025-05-26 | End: 2025-05-26

## 2025-05-26 RX ADMIN — IOPAMIDOL 75 ML: 755 INJECTION, SOLUTION INTRAVENOUS at 15:10

## 2025-05-26 RX ADMIN — SODIUM CHLORIDE 500 ML: 0.9 INJECTION, SOLUTION INTRAVENOUS at 14:40

## 2025-05-26 RX ADMIN — CEPHALEXIN 500 MG: 500 CAPSULE ORAL at 16:40

## 2025-05-26 ASSESSMENT — PAIN - FUNCTIONAL ASSESSMENT: PAIN_FUNCTIONAL_ASSESSMENT: NONE - DENIES PAIN

## 2025-05-26 ASSESSMENT — LIFESTYLE VARIABLES
HOW MANY STANDARD DRINKS CONTAINING ALCOHOL DO YOU HAVE ON A TYPICAL DAY: PATIENT DOES NOT DRINK
HOW OFTEN DO YOU HAVE A DRINK CONTAINING ALCOHOL: NEVER

## 2025-05-26 NOTE — DISCHARGE INSTRUCTIONS
CT Scan Results :  Prostate is enlarged and heterogenous in attenuation, with few small calcifications.  Prostate  measures approximately 5.2 x 5.0 x 5.5 cm and impinges upon  the inferior bladder wall.   Chronic appearing mixed lytic and sclerotic right iliac lesion.      I am worried that the blood in your urine along with the CAT scan results that show an enlarged prostate pushing on your bladder wall along with a lytic bone lesion in your pelvis.  I am worried that you have prostate cancer.  You need to be closely followed up for this.  I have consulted our [facility] oncology nurse navigator to follow-up with you.  They will contact you to help you establish follow-up appointments.  You also need to see the urology doctor as soon as possible this week.  Please also coordinate care with your primary care doctor.  Please stop taking aspirin.  Start the antibiotic for a UTI.  However the blood in the urine could be from prostate cancer.  Please return for any worsening of symptoms.

## 2025-05-26 NOTE — ED PROVIDER NOTES
mouth 4 times daily for 7 days     PHYSICIAN CONSULTS ORDERED THIS ENCOUNTER:  None  FINAL IMPRESSION      1. Gross hematuria    2. Enlarged prostate    3. Lytic lesion of bone on x-ray    4. Acute cystitis with hematuria          DISPOSITION/PLAN   DISPOSITION Decision To Discharge 05/26/2025 04:29:18 PM   DISPOSITION CONDITION Stable           OUTPATIENT FOLLOW UP THE PATIENT:  [facility]  [address] [place] [address]  [phone]  Schedule an appointment as soon as possible for a visit in 1 day      Eliecer Jeffries MD  [address], [place] [id number]  [phone]    Schedule an appointment as soon as possible for a visit in 1 day      Caden Mason MD  [address] [facility] [address]  [phone]    Schedule an appointment as soon as possible for a visit in 1 day        MD Nadya Bonilla Wesley D, MD  05/26/25 [id number]

## 2025-05-27 ENCOUNTER — TELEPHONE (OUTPATIENT)
Dept: UROLOGY | Age: 55
End: 2025-05-27

## 2025-05-27 LAB
MICROORGANISM SPEC CULT: NO GROWTH
SPECIMEN DESCRIPTION: NORMAL

## 2025-05-27 NOTE — TELEPHONE ENCOUNTER
Patient calls in post ER visit. He was told he needs to see urology right away, please advise on appt, thank you.

## 2025-05-28 ENCOUNTER — TELEPHONE (OUTPATIENT)
Dept: ONCOLOGY | Age: 55
End: 2025-05-28

## 2025-05-28 DIAGNOSIS — M89.8X9 LYTIC LESION OF BONE ON X-RAY: Primary | ICD-10-CM

## 2025-05-28 NOTE — TELEPHONE ENCOUNTER
Name: Ector Raymundo  : 1970  MRN: [id number]    Oncology Navigation- Initial Note:    Intake-  Contact Type: Telephone    Continuum of Care:  Workup - R iliac lytic lesion on CT 25    Smoking hx: Current smoker    Notes: Referral received from ER physician. Discussed with Dr. Crews.   Call made to Ector to establish navigation. Writer introduced self as ONN and navigation program. Ector states he has an appointment with Urology on 6/3/25. Ector informed Dr. Crews is happy to see him today if he has questions or is having any pain. Ector denies pain at this time. Ector informed, we can schedule appointment after urology appointment. Ector would like to avoid Monday appointment if possible and is okay with scheduling after urology appointment.    Ector has insurance through [facility]. Reports he will be contacting insurance to inform them of additional visits. Writer encouraged patient to do so. States he is sure he will have financial concerns. Denies weight loss noting decreased appetite. Ector denies any questions at this time. Writers contact information given to patient. Encouraged Ector to call with any question or needs in the meantime. Ector informed someone from the office will reach out to schedule an appointment. Understanding verbalized.     Written referral was in ER note. Referral placed for [facility] as patient would like to stay closer to home.     Electronically signed by Virgie Duncan RN on 2025 at 9:19 AM

## 2025-06-03 ENCOUNTER — OFFICE VISIT (OUTPATIENT)
Dept: UROLOGY | Age: 55
End: 2025-06-03
Payer: COMMERCIAL

## 2025-06-03 VITALS
HEART RATE: 91 BPM | SYSTOLIC BLOOD PRESSURE: 135 MMHG | TEMPERATURE: 98.4 F | DIASTOLIC BLOOD PRESSURE: 82 MMHG | BODY MASS INDEX: 30.96 KG/M2 | HEIGHT: 69 IN | WEIGHT: 209 LBS

## 2025-06-03 DIAGNOSIS — N13.8 BPH WITH OBSTRUCTION/LOWER URINARY TRACT SYMPTOMS: ICD-10-CM

## 2025-06-03 DIAGNOSIS — N40.1 BPH WITH OBSTRUCTION/LOWER URINARY TRACT SYMPTOMS: ICD-10-CM

## 2025-06-03 DIAGNOSIS — R31.0 GROSS HEMATURIA: Primary | ICD-10-CM

## 2025-06-03 DIAGNOSIS — Z12.5 ENCOUNTER FOR SCREENING PROSTATE SPECIFIC ANTIGEN (PSA) MEASUREMENT: ICD-10-CM

## 2025-06-03 PROCEDURE — 3079F DIAST BP 80-89 MM HG: CPT | Performed by: NURSE PRACTITIONER

## 2025-06-03 PROCEDURE — 3075F SYST BP GE 130 - 139MM HG: CPT | Performed by: NURSE PRACTITIONER

## 2025-06-03 PROCEDURE — 51798 US URINE CAPACITY MEASURE: CPT | Performed by: NURSE PRACTITIONER

## 2025-06-03 PROCEDURE — 99204 OFFICE O/P NEW MOD 45 MIN: CPT | Performed by: NURSE PRACTITIONER

## 2025-06-03 RX ORDER — TAMSULOSIN HYDROCHLORIDE 0.4 MG/1
0.4 CAPSULE ORAL DAILY
Qty: 30 CAPSULE | Refills: 3 | Status: SHIPPED | OUTPATIENT
Start: 2025-06-03

## 2025-06-03 RX ORDER — LEVOFLOXACIN 750 MG/1
750 TABLET, FILM COATED ORAL DAILY
Qty: 7 TABLET | Refills: 0 | Status: SHIPPED | OUTPATIENT
Start: 2025-06-03 | End: 2025-06-10

## 2025-06-03 NOTE — PROGRESS NOTES
New patient referral from the ER for gross hematuria.  He did have difficulty initiating his urinary stream and 1 month prior to going to the ER he did have hesitancy.  He did have a CT scan with and without contrast.  This film was independently reviewed and shows no evidence of of  calcifications or hydroureteronephrosis.  There is no suspicious renal mass.  The bladder has normal contour and no suspicion of bladder mass.  The prostate is enlarged.  Creatinine is 0.8, GFR >90.  Urine culture was negative for infection.  PSA from 8/2024 was 3.21.  He is a current every day smoker. He does have current nocturia x3-4, frequency every 1 hour. He denies incontinence, denies dysuria. PVR is low 66ml.    Plan  Levaquin daily for suspected prostatitis    Start Flomax daily for BPH    He will return for cystoscopy to complete a hematuria workup    PSA is not due until August.  I did instruct her not to obtain a PSA now because it will likely be falsely elevated due to lower urinary tract symptoms

## 2025-06-03 NOTE — PATIENT INSTRUCTIONS
There are several changes you can make to your diet to help improve your urinary symptoms. The following have been shown to irritate the bladder and should be AVOIDED:  Coffee  Tea  Dark colored sodas, avoid Mt. Dew also  Alcohol  Spicy foods  Acidic foods     SURVEY:    You may be receiving a survey from [facility] regarding your visit today.    Please complete the survey to enable us to provide the highest quality of care to you and your family.    If you cannot score us a very good on any question, please call the office to discuss how we could have made your experience a very good one.    Thank you.

## 2025-06-04 ENCOUNTER — OFFICE VISIT (OUTPATIENT)
Dept: ONCOLOGY | Age: 55
End: 2025-06-04

## 2025-06-04 VITALS
TEMPERATURE: 98.9 F | WEIGHT: 209 LBS | BODY MASS INDEX: 30.96 KG/M2 | RESPIRATION RATE: 18 BRPM | HEIGHT: 69 IN | SYSTOLIC BLOOD PRESSURE: 137 MMHG | DIASTOLIC BLOOD PRESSURE: 82 MMHG | HEART RATE: 87 BPM

## 2025-06-04 DIAGNOSIS — M89.8X9 LYTIC LESION OF BONE ON X-RAY: Primary | ICD-10-CM

## 2025-06-04 DIAGNOSIS — R31.0 GROSS HEMATURIA: ICD-10-CM

## 2025-06-04 NOTE — PROGRESS NOTES
_           Mr. Ector Raymundo is a very pleasant 54 y.o. male with history of multiple co morbidities as listed.  Patient is referred for evaluation further management of bone lytic lesions.  He had episodes of gross hematuria on memorial day.  He was evaluated in the emergency room and he had CT scan which showed abnormality in the right pelvic bone.  Subsequently patient was evaluated by urology.  He will have cystoscopy on 7/7/2025.  Patient has some fullness in the pelvis.  No actual pain.  No further episodes of hematuria.  No nausea or vomiting.  No fever.  He had no history of any neurologic problems in the past.  No history of kidney stones.  No weight loss or decreased appetite.  No other complaints.  Patient smokes about 1 pack/day for the last 30 years.  Social alcohol drinking.  PAST MEDICAL HISTORY: has a past medical history of Hyperglycemia, Hyperlipidemia, Hypertension, Nicotine abuse, and Obesity.    PAST SURGICAL HISTORY: has a past surgical history that includes other surgical history (Left, 2005); Colonoscopy (N/A, 11/21/2022); and Upper gastrointestinal endoscopy (N/A, 11/21/2022).     CURRENT MEDICATIONS:  has a current medication list which includes the following prescription(s): levofloxacin, tamsulosin, lovastatin, lisinopril, and esomeprazole magnesium.    ALLERGIES:  has no known allergies.    FAMILY HISTORY: Negative for any hematological or oncological conditions.     SOCIAL HISTORY:  reports that he has been smoking cigarettes. He has never used smokeless tobacco. He reports current alcohol use. He reports that he does not use drugs.    REVIEW OF SYSTEMS:     General: Positive for weakness and fatigue. No unanticipated weight loss or decreased appetite. No fever or chills.   Eyes: No blurred vision, eye pain or double vision.   Ears: No hearing problems or drainage. No tinnitus.   Throat: No sore

## 2025-06-05 ENCOUNTER — TELEPHONE (OUTPATIENT)
Dept: ONCOLOGY | Age: 55
End: 2025-06-05

## 2025-06-05 NOTE — TELEPHONE ENCOUNTER
Name: Ector Raymundo  : 1970  MRN: [id number]    Oncology Navigation Follow-Up Note    Noes:  Chart reviewed after visit with Dr. Sandhu on 25. Pt scheduled for cystoscopy on 25 and bone scan on 25 will await results. Pt to be contacted with results. F/u scheduled 25 with Dr. Crews. Mr. Raymundo has writers contact information for question or needs.       Electronically signed by Virgie Duncan RN on 2025 at 10:22 AM

## 2025-06-06 ENCOUNTER — HOSPITAL ENCOUNTER (OUTPATIENT)
Age: 55
Discharge: HOME | End: 2025-06-06
Payer: COMMERCIAL

## 2025-06-06 DIAGNOSIS — M89.8X9 LYTIC LESION OF BONE ON X-RAY: ICD-10-CM

## 2025-06-06 LAB
ERYTHROCYTE [SEDIMENTATION RATE] IN BLOOD BY PHOTOMETRIC METHOD: 3 MM/HR (ref 0–20)
FREE KAPPA/LAMBDA RATIO: 1.02 (ref 0.22–1.74)
IGA SERPL-MCNC: 140 MG/DL (ref 70–400)
IGG SERPL-MCNC: 769 MG/DL (ref 700–1600)
IGM SERPL-MCNC: 153 MG/DL (ref 40–230)
KAPPA LC FREE SER-MCNC: 9.2 MG/L
LAMBDA LC FREE SERPL-MCNC: 9 MG/L (ref 4.2–27.7)

## 2025-06-06 PROCEDURE — 82784 ASSAY IGA/IGD/IGG/IGM EACH: CPT

## 2025-06-06 PROCEDURE — 86334 IMMUNOFIX E-PHORESIS SERUM: CPT

## 2025-06-06 PROCEDURE — 83521 IG LIGHT CHAINS FREE EACH: CPT

## 2025-06-06 PROCEDURE — 36415 COLL VENOUS BLD VENIPUNCTURE: CPT

## 2025-06-06 PROCEDURE — 85652 RBC SED RATE AUTOMATED: CPT

## 2025-06-10 LAB
ITYP INTERPRETATION: NORMAL
PATH REV: NORMAL

## 2025-06-12 ENCOUNTER — HOSPITAL ENCOUNTER (OUTPATIENT)
Dept: NUCLEAR MEDICINE | Age: 55
Discharge: HOME | End: 2025-06-14
Attending: INTERNAL MEDICINE
Payer: COMMERCIAL

## 2025-06-12 DIAGNOSIS — M89.8X9 LYTIC LESION OF BONE ON X-RAY: ICD-10-CM

## 2025-06-12 PROCEDURE — 78306 BONE IMAGING WHOLE BODY: CPT | Performed by: INTERNAL MEDICINE

## 2025-06-12 PROCEDURE — A9503 TC99M MEDRONATE: HCPCS | Performed by: INTERNAL MEDICINE

## 2025-06-12 PROCEDURE — 3430000000 HC RX DIAGNOSTIC RADIOPHARMACEUTICAL: Performed by: INTERNAL MEDICINE

## 2025-06-12 RX ORDER — TC 99M MEDRONATE 20 MG/10ML
25 INJECTION, POWDER, LYOPHILIZED, FOR SOLUTION INTRAVENOUS
Status: COMPLETED | OUTPATIENT
Start: 2025-06-12 | End: 2025-06-12

## 2025-06-12 RX ADMIN — TC 99M MEDRONATE 25 MILLICURIE: 20 INJECTION, POWDER, LYOPHILIZED, FOR SOLUTION INTRAVENOUS at 07:56

## 2025-06-19 ENCOUNTER — TELEPHONE (OUTPATIENT)
Dept: INFUSION THERAPY | Age: 55
End: 2025-06-19

## 2025-06-19 NOTE — TELEPHONE ENCOUNTER
Called patient and informed him Dr. Daily reviewed his chart and his follow up on 7/16/25 is appropriate. Pt verbalized understanding and denies any questions.

## 2025-07-07 ENCOUNTER — PROCEDURE VISIT (OUTPATIENT)
Dept: UROLOGY | Age: 55
End: 2025-07-07
Payer: COMMERCIAL

## 2025-07-07 VITALS
TEMPERATURE: 97.8 F | HEIGHT: 69 IN | DIASTOLIC BLOOD PRESSURE: 70 MMHG | SYSTOLIC BLOOD PRESSURE: 114 MMHG | BODY MASS INDEX: 31.1 KG/M2 | WEIGHT: 210 LBS

## 2025-07-07 DIAGNOSIS — R31.0 GROSS HEMATURIA: Primary | ICD-10-CM

## 2025-07-07 DIAGNOSIS — N13.8 BPH WITH OBSTRUCTION/LOWER URINARY TRACT SYMPTOMS: ICD-10-CM

## 2025-07-07 DIAGNOSIS — N40.1 BPH WITH OBSTRUCTION/LOWER URINARY TRACT SYMPTOMS: ICD-10-CM

## 2025-07-07 PROCEDURE — 3078F DIAST BP <80 MM HG: CPT | Performed by: UROLOGY

## 2025-07-07 PROCEDURE — 3074F SYST BP LT 130 MM HG: CPT | Performed by: UROLOGY

## 2025-07-07 PROCEDURE — 99214 OFFICE O/P EST MOD 30 MIN: CPT | Performed by: UROLOGY

## 2025-07-07 PROCEDURE — 52000 CYSTOURETHROSCOPY: CPT | Performed by: UROLOGY

## 2025-07-07 NOTE — PROGRESS NOTES
During cystoscopy the following was utilized on patient with no adverse affects:    45% SODIUM CHLORIDE 500 ML BAG  Lot number: [id number]  Expiration date: 10/2026      LIDOCAINE HYDROCHLORIDE JELLY 2%   Lot number: [id number]   Expiration date: 12/2025     Cystoscope  Lot: [id number]  Exp:  08/16/2027  
was/were seen in the normal position and effluxing clear urine  Trabeculations No  Diverticulum No  Description: Prostatic hyperplasia         Specimens: Cytology/urine culture No                 Complications:  None; patient tolerated the procedure well.           Disposition: home           Condition: stable     Past Medical History:   Diagnosis Date    Hyperglycemia 4/4/2016    Hyperlipidemia 10/11    Hypertension 2004    Nicotine abuse 10/2011    Obesity 10/2011     Past Surgical History:   Procedure Laterality Date    COLONOSCOPY N/A 11/21/2022    COLORECTAL CANCER SCREENING, HIGH RISK performed by Keron Lipscomb MD at [facility] OR    OTHER SURGICAL HISTORY Left 2005    left parotid tumor, benign.    UPPER GASTROINTESTINAL ENDOSCOPY N/A 11/21/2022    EGD DIAGNOSTIC ONLY performed by Keron Lipscomb MD at [facility] OR     Outpatient Encounter Medications as of 7/7/2025   Medication Sig Dispense Refill    tamsulosin (FLOMAX) 0.4 MG capsule Take 1 capsule by mouth daily 30 capsule 3    lovastatin (MEVACOR) 10 MG tablet Take 1 tablet by mouth nightly 90 tablet 3    lisinopril (PRINIVIL;ZESTRIL) 20 MG tablet Take 1 tablet by mouth daily 90 tablet 3    esomeprazole Magnesium (NEXIUM) 40 MG PACK Take 1 packet by mouth daily       No facility-administered encounter medications on file as of 7/7/2025.      Current Outpatient Medications on File Prior to Visit   Medication Sig Dispense Refill    tamsulosin (FLOMAX) 0.4 MG capsule Take 1 capsule by mouth daily 30 capsule 3    lovastatin (MEVACOR) 10 MG tablet Take 1 tablet by mouth nightly 90 tablet 3    lisinopril (PRINIVIL;ZESTRIL) 20 MG tablet Take 1 tablet by mouth daily 90 tablet 3    esomeprazole Magnesium (NEXIUM) 40 MG PACK Take 1 packet by mouth daily       No current facility-administered medications on file prior to visit.     Patient has no known allergies.  Family History   Problem Relation Age of Onset    Cancer Father         Lung, smoker    High Blood Pressure

## 2025-07-16 ENCOUNTER — TELEPHONE (OUTPATIENT)
Dept: ONCOLOGY | Age: 55
End: 2025-07-16

## 2025-07-16 ENCOUNTER — OFFICE VISIT (OUTPATIENT)
Dept: ONCOLOGY | Age: 55
End: 2025-07-16
Payer: COMMERCIAL

## 2025-07-16 VITALS
BODY MASS INDEX: 30.96 KG/M2 | WEIGHT: 209 LBS | RESPIRATION RATE: 18 BRPM | HEIGHT: 69 IN | SYSTOLIC BLOOD PRESSURE: 113 MMHG | HEART RATE: 83 BPM | DIASTOLIC BLOOD PRESSURE: 74 MMHG | TEMPERATURE: 97.6 F

## 2025-07-16 DIAGNOSIS — M89.8X9 LYTIC LESION OF BONE ON X-RAY: Primary | ICD-10-CM

## 2025-07-16 PROCEDURE — 99213 OFFICE O/P EST LOW 20 MIN: CPT | Performed by: INTERNAL MEDICINE

## 2025-07-16 PROCEDURE — 3074F SYST BP LT 130 MM HG: CPT | Performed by: INTERNAL MEDICINE

## 2025-07-16 PROCEDURE — 3078F DIAST BP <80 MM HG: CPT | Performed by: INTERNAL MEDICINE

## 2025-07-16 NOTE — PROGRESS NOTES
_           Mr. Ector Raymundo is a very pleasant 54 y.o. male with history of multiple co morbidities as listed.  Patient is referred for evaluation further management of bone lytic lesions.  He had episodes of gross hematuria on memorial day.  He was evaluated in the emergency room and he had CT scan which showed abnormality in the right pelvic bone.  Subsequently patient was evaluated by urology.  He will have cystoscopy on 7/7/2025.  Patient has some fullness in the pelvis.  No actual pain.  No further episodes of hematuria.  No nausea or vomiting.  No fever.  He had no history of any neurologic problems in the past.  No history of kidney stones.  No weight loss or decreased appetite.  No other complaints.  Patient smokes about 1 pack/day for the last 30 years.  Social alcohol drinking.  INTERM HISTORY  Patient seen and evaluated.  He is here to review the workup.  Workup was essentially negative.  SPEP was negative and bone scan was negative.  It was noticed that the patient has a bone island which explains the lytic lesion.  PAST MEDICAL HISTORY: has a past medical history of Hyperglycemia, Hyperlipidemia, Hypertension, Nicotine abuse, and Obesity.    PAST SURGICAL HISTORY: has a past surgical history that includes other surgical history (Left, 2005); Colonoscopy (N/A, 11/21/2022); and Upper gastrointestinal endoscopy (N/A, 11/21/2022).     CURRENT MEDICATIONS:  has a current medication list which includes the following prescription(s): tamsulosin, lovastatin, lisinopril, and esomeprazole magnesium.    ALLERGIES:  has no known allergies.    FAMILY HISTORY: Negative for any hematological or oncological conditions.     SOCIAL HISTORY:  reports that he has been smoking cigarettes. He has never used smokeless tobacco. He reports current alcohol use. He reports that he does not use drugs.    REVIEW OF SYSTEMS:     General:

## 2025-07-16 NOTE — TELEPHONE ENCOUNTER
Name: Ector Raymundo  : 1970  MRN: [id number]    Oncology Navigation Follow-Up Note      Notes: Chart reviewed. Bone Scan negative. Follow up as needed. Navigation will sign off at this time and will be available in future if needed.     Electronically signed by Virgie Duncan RN on 2025 at 4:57 PM

## 2025-08-14 ENCOUNTER — RESULTS FOLLOW-UP (OUTPATIENT)
Dept: UROLOGY | Age: 55
End: 2025-08-14